# Patient Record
Sex: MALE | Race: WHITE
[De-identification: names, ages, dates, MRNs, and addresses within clinical notes are randomized per-mention and may not be internally consistent; named-entity substitution may affect disease eponyms.]

---

## 2017-04-16 ENCOUNTER — HOSPITAL ENCOUNTER (EMERGENCY)
Dept: HOSPITAL 17 - PHEFT | Age: 27
Discharge: HOME | End: 2017-04-16
Payer: SELF-PAY

## 2017-04-16 VITALS — BODY MASS INDEX: 22.79 KG/M2 | HEIGHT: 73 IN | WEIGHT: 171.96 LBS

## 2017-04-16 VITALS
TEMPERATURE: 98.2 F | HEART RATE: 78 BPM | RESPIRATION RATE: 16 BRPM | DIASTOLIC BLOOD PRESSURE: 84 MMHG | SYSTOLIC BLOOD PRESSURE: 130 MMHG | OXYGEN SATURATION: 99 %

## 2017-04-16 DIAGNOSIS — F17.210: ICD-10-CM
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
## 2017-04-16 DIAGNOSIS — S20.212A: Primary | ICD-10-CM
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
## 2017-04-16 DIAGNOSIS — R01.1: ICD-10-CM
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
## 2017-04-16 PROCEDURE — 99283 EMERGENCY DEPT VISIT LOW MDM: CPT

## 2017-04-16 PROCEDURE — 71101 X-RAY EXAM UNILAT RIBS/CHEST: CPT

## 2017-04-16 NOTE — RADHPO
EXAM DATE/TIME:  04/16/2017 12:27 

 

[facility] COMPARISON:     

No previous studies available for comparison.

 

                     

INDICATIONS :     

Fall from ladder 3 days ago. Continued left lower rib pain.

                     

 

MEDICAL HISTORY :     

None.          

 

SURGICAL HISTORY :     

None.   

 

ENCOUNTER:     

Initial                                        

 

ACUITY:     

3 days      

 

PAIN SCORE:     

7/10

 

LOCATION:     

Left lower chest 

 

FINDINGS:     

No definite displaced rib fractures or pneumothorax is identified.

 

CONCLUSION:     No definite displaced rib fractures.

 

 

 

 MARCOS Solorio MD on April 16, 2017 at 13:20           

Board Certified Radiologist.

 This report was verified electronically.

## 2017-04-16 NOTE — PD
HPI


Chief Complaint:  Pain: Acute or Chronic


Time Seen by Provider:  12:10


Travel History


International Travel<30 days:  No


Contact w/Intl Traveler<30days:  No


Traveled to known affect area:  No





History of Present Illness


HPI


26 year old male presents to the ED for evaluation of 5/10 left-sided rib pain.

  Onset 3 days ago after he fell approximately 3 feet onto a wooden post.  He 

endorses pain in the area, worsened by deep breathing and certain motions.  He 

also endorses mild nausea.  Denies abdominal pain, anorexia, changes in bowel 

habits, hematuria or back pain.  He's been treating by resting.  He's unsure of 

previous injury to the area.





PFSH


Past Medical History


Hx Anticoagulant Therapy:  No


Anxiety:  Yes


Heart Rhythm Problems:  Yes (HEART MURMUR )


Diminished Hearing:  No


Immunizations Current:  Yes





Social History


Alcohol Use:  Yes (SOCIAL)


Tobacco Use:  Yes (1 PPD)


Substance Use:  No





Allergies-Medications


(Allergen,Severity, Reaction):  


Coded Allergies:  


     Advil (Verified  Allergy, Mild, Swelling, 4/16/17)


Reported Meds & Prescriptions





Reported Meds & Active Scripts


Active


No Active Prescriptions or Reported Medications    








Review of Systems


Except as stated in HPI:  all other systems reviewed are Neg





Physical Exam


Narrative


GENERAL: Well-nourished, well-developed white male in no acute distress.


SKIN: Focused skin assessment warm/dry.  No edema, ecchymosis or abrasion noted.


HEAD: Normocephalic.


EYES: No scleral icterus. No injection or drainage. 


NECK: Supple, trachea midline. No JVD or lymphadenopathy.


CARDIOVASCULAR: Regular rate and rhythm without murmurs, gallops, or rubs. 


CHEST: Tender to palpation of the lower left anterior ribs.


RESPIRATORY: Breath sounds clear and equal bilaterally. No accessory muscle use.


GASTROINTESTINAL: Abdomen soft, non-tender, nondistended. + Active bowel 

sounds.  No tenderness to deep palpation of the spleen.


MUSCULOSKELETAL: No cyanosis, or edema.  Patient is noted to walk with a normal 

gait.  He moves extremities spontaneously.


BACK: Nontender without obvious deformity. No CVA tenderness.  No midline 

tenderness to palpation.





Data


Data


Last Documented VS





Vital Signs








  Date Time  Temp Pulse Resp B/P Pulse Ox O2 Delivery O2 Flow Rate FiO2


 


4/16/17 12:06 98.2 78 16 130/84 99   








Orders





 Ribs, Uni (W/Exp Cxr-Min 3vw) (4/16/17 12:17)








[facility]


Medical Decision Making


Medical Screen Exam Complete:  Yes


Emergency Medical Condition:  Yes


Differential Diagnosis


contusion versus rib fracture versus splenic injury versus pneumothorax versus 

other


Narrative Course


26 year old male presents to the ED for evaluation of 5/10 left-sided rib pain.

  Onset 3 days ago after he fell approximately 3 feet onto a wooden post.  He 

endorses pain in the area, worsened by deep breathing and certain motions.  He 

also endorses mild nausea.  Denies abdominal pain, anorexia, changes in bowel 

habits, hematuria or back pain.  Vitals reviewed.  Physical exam reveals a 

nontoxic-appearing white male in no acute distress.  There is no ecchymosis or 

edema over the anterior lateral ribs. ++ Mild tenderness to palpation of 

anterior lateral ribs.  Abdominal exam is benign.  Deep palpation of the spleen 

produces no pain.  Breath sounds clear and equal bilaterally.  No tenderness to 

palpation of the back.  Patient endorses allergy to Advil.  X-rays reveal no 

definite rib fracture or displaced ribs per radiology read.  This is rib 

contusion.  I offered the patient a prescription for Naprosyn which he 

declined.  Patient instructed to rest, ice, utilize OTC pain medications, 

return to normal, gentle activity as tolerated, follow up with the primary care 

provider.  He indicated understanding of instructions and is agreeable to the 

care plan.  Patient is stable and discharged home.





Diagnosis





 Primary Impression:  


 Contusion of rib on left side


 Qualified Code:  S20.212A - Contusion of rib on left side, initial encounter


Referrals:  


Primary Care Physician


Patient Instructions:  General Instructions, Rib Contusion (ED)





***Additional Instructions:


Rest, ice.


Apply ice no longer than 10-15 minutes per hour a few times a day.


Aleve 500md twice a day to reduce pain and inflammation.


Return to normal, gentle activity as tolerated.


Follow-up with primary care provider.


Return to the ED for any urgent or emergent medical condition.


Scripts


No Active Prescriptions or Reported Meds


Disposition:  01 DISCHARGE HOME


Condition:  Stable








Callie Roberts Apr 16, 2017 12:12

## 2018-01-26 ENCOUNTER — HOSPITAL ENCOUNTER (EMERGENCY)
Dept: HOSPITAL 17 - NEPC | Age: 28
Discharge: HOME | End: 2018-01-26
Payer: SELF-PAY

## 2018-01-26 VITALS — HEIGHT: 73 IN | BODY MASS INDEX: 22.5 KG/M2 | WEIGHT: 169.76 LBS

## 2018-01-26 VITALS
DIASTOLIC BLOOD PRESSURE: 88 MMHG | OXYGEN SATURATION: 100 % | TEMPERATURE: 98.2 F | RESPIRATION RATE: 14 BRPM | HEART RATE: 88 BPM | SYSTOLIC BLOOD PRESSURE: 142 MMHG

## 2018-01-26 DIAGNOSIS — N20.0: Primary | ICD-10-CM

## 2018-01-26 DIAGNOSIS — R01.1: ICD-10-CM

## 2018-01-26 DIAGNOSIS — F41.9: ICD-10-CM

## 2018-01-26 DIAGNOSIS — Z79.899: ICD-10-CM

## 2018-01-26 DIAGNOSIS — Z88.6: ICD-10-CM

## 2018-01-26 LAB
ALBUMIN SERPL-MCNC: 4.2 GM/DL (ref 3.4–5)
ALP SERPL-CCNC: 89 U/L (ref 45–117)
ALT SERPL-CCNC: 15 U/L (ref 12–78)
AST SERPL-CCNC: 13 U/L (ref 15–37)
BASOPHILS # BLD AUTO: 0 TH/MM3 (ref 0–0.2)
BASOPHILS NFR BLD: 0.5 % (ref 0–2)
BILIRUB SERPL-MCNC: 0.3 MG/DL (ref 0.2–1)
BUN SERPL-MCNC: 9 MG/DL (ref 7–18)
CALCIUM SERPL-MCNC: 9 MG/DL (ref 8.5–10.1)
CHLORIDE SERPL-SCNC: 108 MEQ/L (ref 98–107)
COLOR UR: (no result)
CREAT SERPL-MCNC: 0.98 MG/DL (ref 0.6–1.3)
EOSINOPHIL # BLD: 0.1 TH/MM3 (ref 0–0.4)
EOSINOPHIL NFR BLD: 1.5 % (ref 0–4)
ERYTHROCYTE [DISTWIDTH] IN BLOOD BY AUTOMATED COUNT: 12.4 % (ref 11.6–17.2)
GFR SERPLBLD BASED ON 1.73 SQ M-ARVRAT: 90 ML/MIN (ref 89–?)
GLUCOSE SERPL-MCNC: 97 MG/DL (ref 74–106)
GLUCOSE UR STRIP-MCNC: (no result) MG/DL
HCO3 BLD-SCNC: 27.1 MEQ/L (ref 21–32)
HCT VFR BLD CALC: 42.8 % (ref 39–51)
HGB BLD-MCNC: 15.1 GM/DL (ref 13–17)
HGB UR QL STRIP: (no result)
KETONES UR STRIP-MCNC: (no result) MG/DL
LYMPHOCYTES # BLD AUTO: 1.2 TH/MM3 (ref 1–4.8)
LYMPHOCYTES NFR BLD AUTO: 22.5 % (ref 9–44)
MCH RBC QN AUTO: 31.2 PG (ref 27–34)
MCHC RBC AUTO-ENTMCNC: 35.3 % (ref 32–36)
MCV RBC AUTO: 88.4 FL (ref 80–100)
MONOCYTE #: 0.3 TH/MM3 (ref 0–0.9)
MONOCYTES NFR BLD: 6.1 % (ref 0–8)
NEUTROPHILS # BLD AUTO: 3.8 TH/MM3 (ref 1.8–7.7)
NEUTROPHILS NFR BLD AUTO: 69.4 % (ref 16–70)
NITRITE UR QL STRIP: (no result)
PLATELET # BLD: 272 TH/MM3 (ref 150–450)
PMV BLD AUTO: 7.9 FL (ref 7–11)
PROT SERPL-MCNC: 7.3 GM/DL (ref 6.4–8.2)
RBC # BLD AUTO: 4.84 MIL/MM3 (ref 4.5–5.9)
SODIUM SERPL-SCNC: 142 MEQ/L (ref 136–145)
SP GR UR STRIP: 1 (ref 1–1.03)
URINE LEUKOCYTE ESTERASE: (no result)
WBC # BLD AUTO: 5.5 TH/MM3 (ref 4–11)

## 2018-01-26 PROCEDURE — 96361 HYDRATE IV INFUSION ADD-ON: CPT

## 2018-01-26 PROCEDURE — 96375 TX/PRO/DX INJ NEW DRUG ADDON: CPT

## 2018-01-26 PROCEDURE — 96374 THER/PROPH/DIAG INJ IV PUSH: CPT

## 2018-01-26 PROCEDURE — 99285 EMERGENCY DEPT VISIT HI MDM: CPT

## 2018-01-26 PROCEDURE — 80053 COMPREHEN METABOLIC PANEL: CPT

## 2018-01-26 PROCEDURE — 85025 COMPLETE CBC W/AUTO DIFF WBC: CPT

## 2018-01-26 PROCEDURE — 74176 CT ABD & PELVIS W/O CONTRAST: CPT

## 2018-01-26 PROCEDURE — 81001 URINALYSIS AUTO W/SCOPE: CPT

## 2018-01-26 NOTE — RADRPT
EXAM DATE/TIME:  01/26/2018 12:39 

 

[facility] COMPARISON:     

No previous studies available for comparison.

 

 

INDICATIONS :     

Right flank pain,renal calculi.

                  

 

ORAL CONTRAST:      

No oral contrast ingested.

                  

 

RADIATION DOSE:     

7.97 CTDIvol (mGy) 

 

 

MEDICAL HISTORY :       

Heart murmur

 

SURGICAL HISTORY :      

None. 

 

ENCOUNTER:      

Initial

 

ACUITY:      

3 days

 

PAIN SCALE:      

7/10

 

LOCATION:       

Right flank 

 

TECHNIQUE:     

Volumetric scanning of the abdomen and pelvis was performed.  Using automated exposure control and ad
justment of the mA and/or kV according to patient size, radiation dose was kept as low as reasonably 
achievable to obtain optimal diagnostic quality images.  DICOM format image data is available electro
nically for review and comparison.  

 

FINDINGS:     

 

LOWER LUNGS:     

The visualized lower lungs are clear.

 

LIVER:     

Homogeneous density without lesion.  There is no dilation of the biliary tree.  No calcified gallston
es.

 

SPLEEN:     

Normal size without lesion.

 

PANCREAS:     

Within normal limits. 

 

KIDNEYS:     

There is a 5 mm mid right ureteral stone projecting at the level of the L3-L4 disc space. This genera
austin moderate hydronephrosis and proximal hydroureter. No other stones observed. Left kidney is unrema
rkable. No perinephric stranding or fluid collections involving either kidney.

 

ADRENAL GLANDS:     

Within normal limits.

 

VASCULAR:     

There is no aortic aneurysm.

 

BOWEL/MESENTERY:     

The stomach, small bowel, and colon demonstrate no acute abnormality.  There is no free intraperitone
al air or fluid. 

 

ABDOMINAL WALL:     

Within normal limits.

 

RETROPERITONEUM:     

There is no lymphadenopathy.

 

BLADDER:     

No wall thickening or mass.

 

REPRODUCTIVE:     

Within normal limits.

 

INGUINAL:     

There is no lymphadenopathy or hernia.

 

MUSCULOSKELETAL:     

Within normal limits for patient age.

 

CONCLUSION:     

1. 5 mm mid right ureteral stone with resulting obstruction. No perinephric fluid collections.

 

 

 

 Yash Yadav Jr., MD on January 26, 2018 at 12:51           

Board Certified Radiologist.

 This report was verified electronically.

## 2018-01-26 NOTE — PD
HPI


Chief Complaint:  Flank/Kidney Pain


Time Seen by Provider:  12:53


Travel History


International Travel<30 days:  No


Contact w/Intl Traveler<30days:  No


Traveled to known affect area:  No





History of Present Illness


HPI


27-year-old  male presents to emergency department with ongoing right 

kidney stone pain, which has been going on for approximately 10 days.  Patient 

was originally seen at [facility], with follow-up with one of 

their urologist possibly one week ago.  Patient states his pain is still 

intermittent and he had a bad right flank pain attack earlier this morning.  

Patient was on Toradol and Flomax, but currently only on ibuprofen since seeing 

the urologist.  He denies hematuria since the beginning.  No fever, chills, he 

has had nausea but no vomiting.  Patient is reportedly allergic to ibuprofen 

but can take Toradol, and took ibuprofen this morning.





PFSH


Past Medical History


Hx Anticoagulant Therapy:  No


Anxiety:  Yes


Heart Rhythm Problems:  Yes (HEART MURMUR )


Diminished Hearing:  No


Immunizations Current:  Yes





Social History


Alcohol Use:  Yes (SOCIAL)


Tobacco Use:  No (QUIT 4/9/17)


Substance Use:  No





Allergies-Medications


(Allergen,Severity, Reaction):  


Coded Allergies:  


     ibuprofen (Verified  Allergy, Unknown, 1/26/18)


Reported Meds & Prescriptions





Reported Meds & Active Scripts


Active


Hydrocodone-Acetaminophen 5-325 mg Tab 1 Tab PO Q6H PRN


Zofran (Ondansetron HCl) 4 Mg Tab 4 Mg PO Q6HR PRN


Flomax (Tamsulosin HCl) 0.4 Mg Cap 0.4 Mg PO HS


Ibuprofen 800 Mg Tab 800 Mg PO Q8H PRN








Review of Systems


Except as stated in HPI:  all other systems reviewed are Neg


General / Constitutional:  No: Fever, Chills


Eyes:  No: Visual changes


HENT:  No: Headaches


Cardiovascular:  No: Chest Pain or Discomfort


Respiratory:  No: Shortness of Breath


Gastrointestinal:  No: Abdominal Pain


Genitourinary:  Positive: Hematuria, Flank Pain (history), No: Dysuria


Musculoskeletal:  No: Pain


Skin:  No Rash


Neurologic:  No: Weakness


Psychiatric:  No: Depression


Endocrine:  No: Polydipsia


Hematologic/Lymphatic:  No: Easy Bruising





Physical Exam


Narrative


GENERAL: Patient appears in mild distress.


SKIN: Warm and dry.  Normal color.  Normal turgor


HEAD: Atraumatic. Normocephalic. 


EYES: Pupils equal and round. No scleral icterus. No injection or drainage. 


ENT: No nasal bleeding or discharge.  Mucous membranes pink and moist.


NECK: Trachea midline. No JVD. 


CARDIOVASCULAR: Regular rate and rhythm.  


RESPIRATORY: No accessory muscle use. Clear to auscultation. Breath sounds 

equal bilaterally. 


GASTROINTESTINAL: Abdomen soft, non-tender, nondistended. Hepatic and splenic 

margins not palpable.  Mild to moderate right-sided flank pain with pelvic 

compression.


MUSCULOSKELETAL: Extremities without clubbing, cyanosis, or edema. No obvious 

deformities. 


NEUROLOGICAL: Awake and alert. No obvious cranial nerve deficits.  Motor 

grossly within normal limits. Five out of 5 muscle strength in the arms and 

legs.  Normal speech.


PSYCHIATRIC: Appropriate mood and affect; insight and judgment normal.





Data


Data


Last Documented VS





Vital Signs








  Date Time  Temp Pulse Resp B/P (MAP) Pulse Ox O2 Delivery O2 Flow Rate FiO2


 


1/26/18 09:20 98.2 88 14 142/88 (106) 100   








Orders





 Orders


Urinalysis - C+S If Indicated (1/26/18 09:35)


Ct Abd/Pel W/O Iv Contrast (1/26/18 )


Complete Blood Count With Diff (1/26/18 13:06)


Comprehensive Metabolic Panel (1/26/18 13:06)


Iv Access Insert/Monitor (1/26/18 13:06)


Ketorolac Inj (Toradol Inj) (1/26/18 13:15)


Ondansetron Inj (Zofran Inj) (1/26/18 13:15)


Sodium Chloride 0.9% Flush (Ns Flush) (1/26/18 13:15)


Tamsulosin (Flomax) (1/26/18 13:15)


Sodium Chlor 0.9% 1000 Ml Inj (Ns 1000 M (1/26/18 13:15)


Ed Discharge Order (1/26/18 14:48)





Labs





Laboratory Tests








Test


  1/26/18


09:40 1/26/18


13:26 1/26/18


13:36


 


Urine Color LIGHT-YELLOW   


 


Urine Turbidity CLEAR   


 


Urine pH 6.0   


 


Urine Specific Gravity 1.003   


 


Urine Protein NEG mg/dL   


 


Urine Glucose (UA) NEG mg/dL   


 


Urine Ketones TRACE mg/dL   


 


Urine Occult Blood TRACE   


 


Urine Nitrite NEG   


 


Urine Bilirubin NEG   


 


Urine Urobilinogen


  LESS THAN 2.0


MG/DL 


  


 


 


Urine Leukocyte Esterase SMALL   


 


Urine RBC 2 /hpf   


 


Urine WBC 3 /hpf   


 


Microscopic Urinalysis Comment


  CULT NOT


INDICATED 


  


 


 


Blood Urea Nitrogen  9 MG/DL  


 


Creatinine  0.98 MG/DL  


 


Random Glucose  97 MG/DL  


 


Total Protein  7.3 GM/DL  


 


Albumin  4.2 GM/DL  


 


Calcium Level  9.0 MG/DL  


 


Alkaline Phosphatase  89 U/L  


 


Aspartate Amino Transf


(AST/SGOT) 


  13 U/L 


  


 


 


Alanine Aminotransferase


(ALT/SGPT) 


  15 U/L 


  


 


 


Total Bilirubin  0.3 MG/DL  


 


Sodium Level  142 MEQ/L  


 


Potassium Level  3.8 MEQ/L  


 


Chloride Level  108 MEQ/L  


 


Carbon Dioxide Level  27.1 MEQ/L  


 


Anion Gap  7 MEQ/L  


 


Estimat Glomerular Filtration


Rate 


  90 ML/MIN 


  


 


 


White Blood Count   5.5 TH/MM3 


 


Red Blood Count   4.84 MIL/MM3 


 


Hemoglobin   15.1 GM/DL 


 


Hematocrit   42.8 % 


 


Mean Corpuscular Volume   88.4 FL 


 


Mean Corpuscular Hemoglobin   31.2 PG 


 


Mean Corpuscular Hemoglobin


Concent 


  


  35.3 % 


 


 


Red Cell Distribution Width   12.4 % 


 


Platelet Count   272 TH/MM3 


 


Mean Platelet Volume   7.9 FL 


 


Neutrophils (%) (Auto)   69.4 % 


 


Lymphocytes (%) (Auto)   22.5 % 


 


Monocytes (%) (Auto)   6.1 % 


 


Eosinophils (%) (Auto)   1.5 % 


 


Basophils (%) (Auto)   0.5 % 


 


Neutrophils # (Auto)   3.8 TH/MM3 


 


Lymphocytes # (Auto)   1.2 TH/MM3 


 


Monocytes # (Auto)   0.3 TH/MM3 


 


Eosinophils # (Auto)   0.1 TH/MM3 


 


Basophils # (Auto)   0.0 TH/MM3 


 


CBC Comment   DIFF FINAL 


 


Differential Comment    











MDM


Medical Decision Making


Medical Screen Exam Complete:  Yes


Emergency Medical Condition:  Yes


Differential Diagnosis


Right flank pain.  Renal colic.  Obstructive nephritis.


Narrative Course


Urine was ordered as well as CT scan while in triage.


Urinalysis positive for blood.  No signs of infection.


CT scan shows:





There is a 5 mm mid right ureteral stone projecting at the level of the L3-L4 

disc space. This generates moderate hydronephrosis and proximal hydroureter. No 

other stones observed. Left kidney is unremarkable. No perinephric stranding or 

fluid collections involving either kidney.





IV access is obtained and labs are obtained including CBC, and CMP.


Patient is given 30 g Toradol IV as well as 4 mg Zofran IV, as well as 0.4 mg 

Flomax by mouth.


Patient is given 1000 mL normal saline bolus.


CBC is unremarkable.


CMP is unremarkable.  BUN and creatinine are normal at 7 and 0.98 

consecutively.  GFR is 90.


Call was placed to Dr. Berrios, the urologist on-call, to discuss the patient.


Patient discussed with Dr. Berrios,


Patient discharged home on ibuprofen 800 mg 3 times daily with food #30.


Patient placed on Flomax 0.4 mg daily #30.


Patient also placed on Zofran 4 mg every 6 hours when necessary nausea.


Patient given Lortab 5/325 one tab every 6 hours when necessary pain #10.


Patient to call Dr. Berrios's office for follow-up.


Patient can return the emergency Department with worsening symptoms as 

discussed.





Diagnosis





 Primary Impression:  


 Kidney stone on right side


Referrals:  


Shine Berrios DO


Patient Instructions:  General Instructions, Kidney Stones (ED)





***Additional Instructions:  


Patient discharged home on ibuprofen 800 mg 3 times daily with food #30.


Patient placed on Flomax 0.4 mg daily #30.


Patient also placed on Zofran 4 mg every 6 hours when necessary nausea.


Patient given Lortab 5/325 one tab every 6 hours when necessary pain #10.


Patient to call Dr. Berrios's office for follow-up.


Patient can return the emergency Department with worsening symptoms as 

discussed.


***Med/Other Pt SpecificInfo:  Prescription(s) given


Scripts


Hydrocodone-Acetaminophen (Hydrocodone-Acetaminophen) 5-325 mg Tab


1 TAB PO Q6H Y for PAIN, #10 TAB 0 Refills


   Prov: Nithin Mccarthy MD         1/26/18 


Ondansetron (Zofran) 4 Mg Tab


4 MG PO Q6HR Y for NAUSEA OR VOMITING, #12 TAB 0 Refills


   Prov: Nithin Mccatrhy MD         1/26/18 


Tamsulosin (Flomax) 0.4 Mg Cap


0.4 MG PO HS for Manage Prostate Problems, #30 CAP 0 Refills


   Prov: Nithin Mccarthy MD         1/26/18 


Ibuprofen (Ibuprofen) 800 Mg Tab


800 MG PO Q8H Y for Pain/Inflammation, #30 TAB 0 Refills


   Prov: Nithin Mccarthy MD         1/26/18


Disposition:  01 DISCHARGE HOME


Condition:  Stable











See Crook Jan 26, 2018 13:19

## 2018-02-04 ENCOUNTER — HOSPITAL ENCOUNTER (EMERGENCY)
Dept: HOSPITAL 17 - NEPC | Age: 28
Discharge: HOME | End: 2018-02-04
Payer: SELF-PAY

## 2018-02-04 VITALS — SYSTOLIC BLOOD PRESSURE: 123 MMHG | DIASTOLIC BLOOD PRESSURE: 77 MMHG

## 2018-02-04 VITALS
HEART RATE: 95 BPM | SYSTOLIC BLOOD PRESSURE: 156 MMHG | TEMPERATURE: 97.6 F | OXYGEN SATURATION: 97 % | RESPIRATION RATE: 12 BRPM | DIASTOLIC BLOOD PRESSURE: 91 MMHG

## 2018-02-04 DIAGNOSIS — F41.9: ICD-10-CM

## 2018-02-04 DIAGNOSIS — Z79.899: ICD-10-CM

## 2018-02-04 DIAGNOSIS — R05: ICD-10-CM

## 2018-02-04 DIAGNOSIS — R53.83: Primary | ICD-10-CM

## 2018-02-04 DIAGNOSIS — Z87.442: ICD-10-CM

## 2018-02-04 DIAGNOSIS — Z72.0: ICD-10-CM

## 2018-02-04 LAB
BASOPHILS # BLD AUTO: 0 TH/MM3 (ref 0–0.2)
BASOPHILS NFR BLD: 0.3 % (ref 0–2)
BUN SERPL-MCNC: 9 MG/DL (ref 7–18)
CALCIUM SERPL-MCNC: 9.2 MG/DL (ref 8.5–10.1)
CHLORIDE SERPL-SCNC: 106 MEQ/L (ref 98–107)
COLOR UR: (no result)
CREAT SERPL-MCNC: 0.8 MG/DL (ref 0.6–1.3)
EOSINOPHIL # BLD: 0.1 TH/MM3 (ref 0–0.4)
EOSINOPHIL NFR BLD: 0.9 % (ref 0–4)
ERYTHROCYTE [DISTWIDTH] IN BLOOD BY AUTOMATED COUNT: 12.7 % (ref 11.6–17.2)
GFR SERPLBLD BASED ON 1.73 SQ M-ARVRAT: 114 ML/MIN (ref 89–?)
GLUCOSE SERPL-MCNC: 82 MG/DL (ref 74–106)
GLUCOSE UR STRIP-MCNC: (no result) MG/DL
HCO3 BLD-SCNC: 27.6 MEQ/L (ref 21–32)
HCT VFR BLD CALC: 44.2 % (ref 39–51)
HGB BLD-MCNC: 15.3 GM/DL (ref 13–17)
HGB UR QL STRIP: (no result)
KETONES UR STRIP-MCNC: (no result) MG/DL
LYMPHOCYTES # BLD AUTO: 1.1 TH/MM3 (ref 1–4.8)
LYMPHOCYTES NFR BLD AUTO: 15.2 % (ref 9–44)
MCH RBC QN AUTO: 30.6 PG (ref 27–34)
MCHC RBC AUTO-ENTMCNC: 34.6 % (ref 32–36)
MCV RBC AUTO: 88.5 FL (ref 80–100)
MONOCYTE #: 0.4 TH/MM3 (ref 0–0.9)
MONOCYTES NFR BLD: 4.9 % (ref 0–8)
NEUTROPHILS # BLD AUTO: 5.8 TH/MM3 (ref 1.8–7.7)
NEUTROPHILS NFR BLD AUTO: 78.7 % (ref 16–70)
NITRITE UR QL STRIP: (no result)
PLATELET # BLD: 230 TH/MM3 (ref 150–450)
PMV BLD AUTO: 8.4 FL (ref 7–11)
RBC # BLD AUTO: 5 MIL/MM3 (ref 4.5–5.9)
SODIUM SERPL-SCNC: 140 MEQ/L (ref 136–145)
SP GR UR STRIP: 1 (ref 1–1.03)
SQUAMOUS #/AREA URNS HPF: <1 /HPF (ref 0–5)
URINE LEUKOCYTE ESTERASE: (no result)
WBC # BLD AUTO: 7.4 TH/MM3 (ref 4–11)

## 2018-02-04 PROCEDURE — 85025 COMPLETE CBC W/AUTO DIFF WBC: CPT

## 2018-02-04 PROCEDURE — 87804 INFLUENZA ASSAY W/OPTIC: CPT

## 2018-02-04 PROCEDURE — 80048 BASIC METABOLIC PNL TOTAL CA: CPT

## 2018-02-04 PROCEDURE — 96360 HYDRATION IV INFUSION INIT: CPT

## 2018-02-04 PROCEDURE — 99283 EMERGENCY DEPT VISIT LOW MDM: CPT

## 2018-02-04 PROCEDURE — 81001 URINALYSIS AUTO W/SCOPE: CPT

## 2018-02-04 NOTE — PD
HPI


Chief Complaint:  Cold / Flu Symptoms


Time Seen by Provider:  14:09


Travel History


International Travel<30 days:  No


Contact w/Intl Traveler<30days:  No


Traveled to known affect area:  No





History of Present Illness


HPI


27-year-old male presents for evaluation of chills, sweaty palms, fatigue.  

Symptoms started this morning.  He reports a history of right ureteral stone 

initially for the past few weeks.  Initially he was seen at an outside 

emergency room and then she was seen here in January 26, 2018 where he was 

found to have a right 5 mm ureteral stone.  She reports that he has been 

asymptomatic today in regards to his kidney stone.  He has had no abdominal or 

flank pain.  He has had intermittent pain over the past few days.  He has been 

urinating into restrainer "most of the time" and has not passed the stone as 

far as he is aware.  He has had a slight cough over the past 2 weeks.  He 

reports that he has been hydrating well.  He denies any fevers, chills, sore 

throat, abdominal pain, nausea or vomiting, diarrhea or constipation.  He has 

been unable to follow-up with a urologist since his recent visit secondary to 

lack of insurance.  He has no other complaints.





PFSH


Past Medical History


Hx Anticoagulant Therapy:  No


Anxiety:  Yes


Heart Rhythm Problems:  Yes


Diminished Hearing:  No


Immunizations Current:  Yes





Social History


Alcohol Use:  Yes (SOCIAL)


Tobacco Use:  Yes


Substance Use:  No





Allergies-Medications


(Allergen,Severity, Reaction):  


Coded Allergies:  


     No Known Allergies (Unverified , 2/4/18)


Reported Meds & Prescriptions





Reported Meds & Active Scripts


Active


Hydrocodone-Acetaminophen 5-325 mg Tab 1 Tab PO Q6H PRN


Zofran (Ondansetron HCl) 4 Mg Tab 4 Mg PO Q6HR PRN


Flomax (Tamsulosin HCl) 0.4 Mg Cap 0.4 Mg PO HS


Ibuprofen 800 Mg Tab 800 Mg PO Q8H PRN








Review of Systems


Except as stated in HPI:  all other systems reviewed are Neg





Physical Exam


Narrative


GENERAL: Well-developed well-nourished male in no acute distress


SKIN: Warm and dry.


HEAD: Atraumatic. Normocephalic. 


EYES: Pupils equal and round. No scleral icterus. No injection or drainage. 


ENT: No nasal bleeding or discharge.  Mucous membranes pink and moist.


NECK: Trachea midline. No JVD. 


CARDIOVASCULAR: Regular rate and rhythm.  No murmur appreciated.


RESPIRATORY: No accessory muscle use. Clear to auscultation. Breath sounds 

equal bilaterally. 


GASTROINTESTINAL: Abdomen soft, non-tender, nondistended. Hepatic and splenic 

margins not palpable.  No CVA tenderness


MUSCULOSKELETAL: No obvious deformities. No clubbing.  No cyanosis.  No edema. 


NEUROLOGICAL: Awake and alert. No obvious cranial nerve deficits.  Motor 

grossly within normal limits. Normal speech.


PSYCHIATRIC: Appropriate mood and affect; insight and judgment normal.





Data


Data


Last Documented VS





Vital Signs








  Date Time  Temp Pulse Resp B/P (MAP) Pulse Ox O2 Delivery O2 Flow Rate FiO2


 


2/4/18 14:13   16  98 Room Air  


 


2/4/18 12:28 97.6 95  156/91 (112)    








Orders





 Orders


Influenzae A/B Antigen (2/4/18 12:35)


Urinalysis - C+S If Indicated (2/4/18 12:40)


Basic Metabolic Panel (Bmp) (2/4/18 14:35)


Complete Blood Count With Diff (2/4/18 14:35)


Ondansetron Inj (Zofran Inj) (2/4/18 14:45)


Sodium Chlor 0.9% 1000 Ml Inj (Ns 1000 M (2/4/18 14:35)


Ed Discharge Order (2/4/18 15:54)


Mandatory Outpatient Referral (2/4/18 15:55)





Labs





Laboratory Tests








Test


  2/4/18


12:44 2/4/18


15:02


 


Urine Color STRAW  


 


Urine Turbidity CLEAR  


 


Urine pH 6.5  


 


Urine Specific Gravity 1.001  


 


Urine Protein NEG mg/dL  


 


Urine Glucose (UA) NEG mg/dL  


 


Urine Ketones NEG mg/dL  


 


Urine Occult Blood TRACE  


 


Urine Nitrite NEG  


 


Urine Bilirubin NEG  


 


Urine Urobilinogen


  LESS THAN 2.0


MG/DL 


 


 


Urine Leukocyte Esterase NEG  


 


Urine RBC


  LESS THAN 1


/hpf 


 


 


Urine WBC


  LESS THAN 1


/hpf 


 


 


Urine Squamous Epithelial


Cells <1 /hpf 


  


 


 


Microscopic Urinalysis Comment


  CULT NOT


INDICATED 


 


 


White Blood Count  7.4 TH/MM3 


 


Red Blood Count  5.00 MIL/MM3 


 


Hemoglobin  15.3 GM/DL 


 


Hematocrit  44.2 % 


 


Mean Corpuscular Volume  88.5 FL 


 


Mean Corpuscular Hemoglobin  30.6 PG 


 


Mean Corpuscular Hemoglobin


Concent 


  34.6 % 


 


 


Red Cell Distribution Width  12.7 % 


 


Platelet Count  230 TH/MM3 


 


Mean Platelet Volume  8.4 FL 


 


Neutrophils (%) (Auto)  78.7 % 


 


Lymphocytes (%) (Auto)  15.2 % 


 


Monocytes (%) (Auto)  4.9 % 


 


Eosinophils (%) (Auto)  0.9 % 


 


Basophils (%) (Auto)  0.3 % 


 


Neutrophils # (Auto)  5.8 TH/MM3 


 


Lymphocytes # (Auto)  1.1 TH/MM3 


 


Monocytes # (Auto)  0.4 TH/MM3 


 


Eosinophils # (Auto)  0.1 TH/MM3 


 


Basophils # (Auto)  0.0 TH/MM3 


 


CBC Comment  DIFF FINAL 


 


Differential Comment   


 


Blood Urea Nitrogen  9 MG/DL 


 


Creatinine  0.80 MG/DL 


 


Random Glucose  82 MG/DL 


 


Calcium Level  9.2 MG/DL 


 


Sodium Level  140 MEQ/L 


 


Potassium Level  3.7 MEQ/L 


 


Chloride Level  106 MEQ/L 


 


Carbon Dioxide Level  27.6 MEQ/L 


 


Anion Gap  6 MEQ/L 


 


Estimat Glomerular Filtration


Rate 


  114 ML/MIN 


 











MDM


Medical Decision Making


Medical Screen Exam Complete:  Yes


Emergency Medical Condition:  Yes


Medical Record Reviewed:  Yes


Differential Diagnosis


Anxiety, dehydration, renal failure, pyelonephritis, influenza, bronchitis


Narrative Course


27-year-old male with known right-sided 5 mm ureteral stone presents with 1 day 

history of chills, sweaty palms and fatigue.  He appears well.  His abdomen is 

soft nontender.  No CVA tenderness.  A urinalysis was obtained in triage 

revealing trace blood with no other acute abnormalities.  Influenza antigen was 

negative.  He will be given IV fluids, Zofran.  CBC, BMP have been ordered.





CBC and BMP are unremarkable.  The patient appears well.  He does note that he 

has been cutting out caffeine from his diet and this could be causing his 

fatigue.  I have ordered a mandatory outpatient referral for urology follow-up 

in regards to his right 5 mm ureteral stone.  I discussed signs and symptoms 

that would warrant returning to the emergency room.  He is stable for discharge.





Diagnosis





 Primary Impression:  


 Fatigue


 Additional Impression:  


 History of renal stone





***Additional Instructions:  


Stay well hydrated and well-nourished, urinate into a strainer, follow-up with 

urology.  If you develop intractable nausea and vomiting, intractable flank or 

abdominal pain, fevers, return to the emergency room.


***Med/Other Pt SpecificInfo:  No Change to Meds


Disposition:  01 DISCHARGE HOME


Condition:  Stable











Junaid Bowen Feb 4, 2018 14:43